# Patient Record
Sex: MALE | Race: OTHER | HISPANIC OR LATINO | URBAN - METROPOLITAN AREA
[De-identification: names, ages, dates, MRNs, and addresses within clinical notes are randomized per-mention and may not be internally consistent; named-entity substitution may affect disease eponyms.]

---

## 2022-04-21 ENCOUNTER — EMERGENCY (EMERGENCY)
Facility: HOSPITAL | Age: 22
LOS: 1 days | Discharge: HOME | End: 2022-04-21
Attending: EMERGENCY MEDICINE
Payer: COMMERCIAL

## 2022-04-21 VITALS
OXYGEN SATURATION: 98 % | HEART RATE: 86 BPM | SYSTOLIC BLOOD PRESSURE: 124 MMHG | DIASTOLIC BLOOD PRESSURE: 86 MMHG | TEMPERATURE: 99 F | RESPIRATION RATE: 18 BRPM

## 2022-04-21 VITALS
DIASTOLIC BLOOD PRESSURE: 71 MMHG | HEART RATE: 97 BPM | OXYGEN SATURATION: 97 % | TEMPERATURE: 101 F | SYSTOLIC BLOOD PRESSURE: 115 MMHG | RESPIRATION RATE: 16 BRPM

## 2022-04-21 LAB — SARS-COV-2 RNA SPEC QL NAA+PROBE: DETECTED

## 2022-04-21 PROCEDURE — 99283 EMERGENCY DEPT VISIT LOW MDM: CPT | Mod: 25

## 2022-04-21 PROCEDURE — 96372 THER/PROPH/DIAG INJ SC/IM: CPT

## 2022-04-21 PROCEDURE — 99284 EMERGENCY DEPT VISIT MOD MDM: CPT

## 2022-04-21 PROCEDURE — 87635 SARS-COV-2 COVID-19 AMP PRB: CPT

## 2022-04-21 RX ORDER — KETOROLAC TROMETHAMINE 30 MG/ML
60 SYRINGE (ML) INJECTION ONCE
Refills: 0 | Status: DISCONTINUED | OUTPATIENT
Start: 2022-04-21 | End: 2022-04-21

## 2022-04-21 RX ADMIN — Medication 60 MILLIGRAM(S): at 22:09

## 2022-04-21 NOTE — ED PROVIDER NOTE - OBJECTIVE STATEMENT
#[id number]    22 y/o male with no significant PMHx, c/o few days of fever, body aches, mild shortness of breath, cough, and dizziness. Wife tested positive for COVID on 4/19. Patient states he had 1 COVID vaccine in october, Pfizer vaccine. Patient does not smoke.

## 2022-04-21 NOTE — ED PROVIDER NOTE - CLINICAL SUMMARY MEDICAL DECISION MAKING FREE TEXT BOX
COVID suspected however Pulse Ox on room air doing well. Will check COVID swab confirmation and advised symptomatic care.

## 2022-04-21 NOTE — ED PROVIDER NOTE - NSFOLLOWUPINSTRUCTIONS_ED_ALL_ED_FT
10 cosas que puede hacer para controlar los síntomas de COVID-19 en casa    10 Things You Can Do to Manage Your COVID-19 Symptoms at Home      Si tiene la infección por COVID-19 posible o confirmada    1. Quédese en [place] casa, excepto para obtener atención médica.      2. Preste atención a kate síntomas cuidadosamente. Si kate síntomas empeoran, llame al médico de inmediato.      3. Descanse y manténgase hidratado.       4.Si tiene perry cierra médica, llame al médico con anticipación e infórmele que tiene o puede tener COVID-19.      5.Si tiene perry emergencia médica, llame al 911 y avise al personal de despacho que tiene o puede tener COVID-19.      6. Al toser y al estornudar, cúbrase la boca y la nariz con un pañuelo descartable o con el pliegue del codo.      7. Lávese las andre con frecuencia con agua y jabón marcial al menos 20 segundos, o kalie límpiese las andre con un desinfectante de andre a base de alcohol que contenga al menos un 60 % de alcohol.      8.En la mayor medida posible, permanezca en perry habitación específica y lejos de otras personas en [place] hogar. Además, debe utilizar un baño aparte, si es posible. Si necesita estar cerca de otras personas dentro o fuera de la casa, use perry mascarilla.      9. Evite compartir objetos personales con otras personas de la casa, tavo platos, toallas y ropa de cama.      10. Limpie todas las superficies que se tocan con frecuencia, tavo encimeras, mesas y picaportes. Utilice los aerosoles o las toallitas húmedas de limpieza doméstica según las instrucciones de la etiqueta.      cdc.gov/coronavirus      07/16/2021    Esta información no tiene tavo fin reemplazar el consejo del médico. Asegúrese de hacerle al médico cualquier pregunta que tenga.      Document Revised: 01/15/2022 Document Reviewed: 02/04/2022    [employer] Patient Education © 2022 [employer]

## 2022-04-21 NOTE — ED PROVIDER NOTE - PATIENT PORTAL LINK FT
You can access the FollowMyHealth Patient Portal offered by [facility] by registering at the following website: [email or web address]. By joining [facility]’s FollowMyHealth portal, you will also be able to view your health information using other applications (apps) compatible with our system.